# Patient Record
Sex: FEMALE | Race: WHITE | NOT HISPANIC OR LATINO | Employment: FULL TIME | URBAN - METROPOLITAN AREA
[De-identification: names, ages, dates, MRNs, and addresses within clinical notes are randomized per-mention and may not be internally consistent; named-entity substitution may affect disease eponyms.]

---

## 2023-04-03 LAB
CHOLEST SERPL-MSCNC: 149 MG/DL (ref 0–200)
HBA1C MFR BLD: 4.9 % (ref 4–6)
HDLC SERPL-MCNC: 51 MG/DL (ref 35–70)
LDLC SERPL CALC-MCNC: 86 MG/DL
TRIGL SERPL-MCNC: 56 MG/DL (ref 40–160)

## 2023-07-10 ENCOUNTER — TELEPHONE (OUTPATIENT)
Dept: FAMILY MEDICINE | Facility: CLINIC | Age: 33
End: 2023-07-10

## 2023-07-10 NOTE — TELEPHONE ENCOUNTER
----- Message from Claire Khan sent at 7/10/2023 11:00 AM CDT -----  Regarding: ADDERALL REFILL  NEEDS REFILL AT [facility]

## 2023-08-28 DIAGNOSIS — G89.29 CHRONIC FOOT PAIN, UNSPECIFIED LATERALITY: Primary | ICD-10-CM

## 2023-08-28 DIAGNOSIS — M79.673 CHRONIC FOOT PAIN, UNSPECIFIED LATERALITY: Primary | ICD-10-CM

## 2023-08-28 RX ORDER — DICLOFENAC SODIUM 75 MG/1
75 TABLET, DELAYED RELEASE ORAL DAILY PRN
COMMUNITY
Start: 2023-07-10 | End: 2023-08-28 | Stop reason: SDUPTHER

## 2023-08-29 RX ORDER — DICLOFENAC SODIUM 75 MG/1
75 TABLET, DELAYED RELEASE ORAL DAILY PRN
Qty: 30 TABLET | Refills: 2 | Status: SHIPPED | OUTPATIENT
Start: 2023-08-29 | End: 2023-12-28 | Stop reason: SDUPTHER

## 2023-11-08 RX ORDER — NEOMYCIN SULFATE, POLYMYXIN B SULFATE AND DEXAMETHASONE 3.5; 10000; 1 MG/ML; [USP'U]/ML; MG/ML
SUSPENSION/ DROPS OPHTHALMIC
COMMUNITY
Start: 2023-10-24

## 2023-11-08 RX ORDER — SPIRONOLACTONE 25 MG/1
25 TABLET ORAL
COMMUNITY
Start: 2023-08-28

## 2023-11-08 RX ORDER — BUPROPION HYDROCHLORIDE 150 MG/1
300 TABLET ORAL
COMMUNITY
Start: 2023-07-10

## 2023-11-08 RX ORDER — DEXTROAMPHETAMINE SACCHARATE, AMPHETAMINE ASPARTATE, DEXTROAMPHETAMINE SULFATE AND AMPHETAMINE SULFATE 7.5; 7.5; 7.5; 7.5 MG/1; MG/1; MG/1; MG/1
1 TABLET ORAL
COMMUNITY
Start: 2023-06-05

## 2023-11-09 RX ORDER — CHOLECALCIFEROL (VITAMIN D3) 25 MCG
5000 TABLET ORAL DAILY
COMMUNITY

## 2023-11-09 RX ORDER — LANOLIN ALCOHOL/MO/W.PET/CERES
100 CREAM (GRAM) TOPICAL DAILY
COMMUNITY

## 2023-11-09 RX ORDER — TIRZEPATIDE 5 MG/.5ML
5 INJECTION, SOLUTION SUBCUTANEOUS
COMMUNITY

## 2023-12-28 DIAGNOSIS — G89.29 CHRONIC FOOT PAIN, UNSPECIFIED LATERALITY: ICD-10-CM

## 2023-12-28 DIAGNOSIS — M79.673 CHRONIC FOOT PAIN, UNSPECIFIED LATERALITY: ICD-10-CM

## 2023-12-28 RX ORDER — DICLOFENAC SODIUM 75 MG/1
75 TABLET, DELAYED RELEASE ORAL DAILY PRN
Qty: 30 TABLET | Refills: 1 | Status: SHIPPED | OUTPATIENT
Start: 2023-12-28

## 2025-01-16 ENCOUNTER — OFFICE VISIT (OUTPATIENT)
Dept: FAMILY MEDICINE | Facility: CLINIC | Age: 35
End: 2025-01-16
Payer: MEDICAID

## 2025-01-16 VITALS
HEART RATE: 102 BPM | DIASTOLIC BLOOD PRESSURE: 88 MMHG | SYSTOLIC BLOOD PRESSURE: 117 MMHG | HEIGHT: 65 IN | WEIGHT: 293 LBS | OXYGEN SATURATION: 100 % | RESPIRATION RATE: 20 BRPM | BODY MASS INDEX: 48.82 KG/M2 | TEMPERATURE: 99 F

## 2025-01-16 DIAGNOSIS — E66.813 CLASS 3 SEVERE OBESITY WITHOUT SERIOUS COMORBIDITY WITH BODY MASS INDEX (BMI) OF 50.0 TO 59.9 IN ADULT, UNSPECIFIED OBESITY TYPE: ICD-10-CM

## 2025-01-16 DIAGNOSIS — R60.0 LOWER LEG EDEMA: ICD-10-CM

## 2025-01-16 DIAGNOSIS — E55.9 VITAMIN D DEFICIENCY: ICD-10-CM

## 2025-01-16 DIAGNOSIS — E66.01 CLASS 3 SEVERE OBESITY WITHOUT SERIOUS COMORBIDITY WITH BODY MASS INDEX (BMI) OF 50.0 TO 59.9 IN ADULT, UNSPECIFIED OBESITY TYPE: ICD-10-CM

## 2025-01-16 DIAGNOSIS — F98.8 ATTENTION DEFICIT DISORDER, UNSPECIFIED TYPE: Primary | ICD-10-CM

## 2025-01-16 PROCEDURE — 1160F RVW MEDS BY RX/DR IN RCRD: CPT | Mod: CPTII,,,

## 2025-01-16 PROCEDURE — 3008F BODY MASS INDEX DOCD: CPT | Mod: CPTII,,,

## 2025-01-16 PROCEDURE — 99214 OFFICE O/P EST MOD 30 MIN: CPT | Mod: ,,,

## 2025-01-16 PROCEDURE — 1159F MED LIST DOCD IN RCRD: CPT | Mod: CPTII,,,

## 2025-01-16 PROCEDURE — 3079F DIAST BP 80-89 MM HG: CPT | Mod: CPTII,,,

## 2025-01-16 PROCEDURE — 3044F HG A1C LEVEL LT 7.0%: CPT | Mod: CPTII,,,

## 2025-01-16 PROCEDURE — 3074F SYST BP LT 130 MM HG: CPT | Mod: CPTII,,,

## 2025-01-16 RX ORDER — DEXTROAMPHETAMINE SACCHARATE, AMPHETAMINE ASPARTATE, DEXTROAMPHETAMINE SULFATE AND AMPHETAMINE SULFATE 7.5; 7.5; 7.5; 7.5 MG/1; MG/1; MG/1; MG/1
1 TABLET ORAL DAILY
Qty: 30 TABLET | Refills: 0 | Status: SHIPPED | OUTPATIENT
Start: 2025-02-16 | End: 2025-03-18

## 2025-01-16 RX ORDER — SPIRONOLACTONE 25 MG/1
25 TABLET ORAL DAILY
Qty: 90 TABLET | Refills: 1 | Status: SHIPPED | OUTPATIENT
Start: 2025-01-16 | End: 2025-07-15

## 2025-01-16 RX ORDER — DEXTROAMPHETAMINE SACCHARATE, AMPHETAMINE ASPARTATE, DEXTROAMPHETAMINE SULFATE AND AMPHETAMINE SULFATE 7.5; 7.5; 7.5; 7.5 MG/1; MG/1; MG/1; MG/1
1 TABLET ORAL DAILY
Qty: 30 TABLET | Refills: 0 | Status: SHIPPED | OUTPATIENT
Start: 2025-01-16 | End: 2025-02-15

## 2025-01-16 RX ORDER — DEXTROAMPHETAMINE SACCHARATE, AMPHETAMINE ASPARTATE, DEXTROAMPHETAMINE SULFATE AND AMPHETAMINE SULFATE 7.5; 7.5; 7.5; 7.5 MG/1; MG/1; MG/1; MG/1
1 TABLET ORAL DAILY
Qty: 30 TABLET | Refills: 0 | Status: SHIPPED | OUTPATIENT
Start: 2025-03-16 | End: 2025-04-15

## 2025-01-16 RX ORDER — ERGOCALCIFEROL 1.25 MG/1
50000 CAPSULE ORAL
Qty: 4 CAPSULE | Refills: 5 | Status: SHIPPED | OUTPATIENT
Start: 2025-01-16 | End: 2025-07-15

## 2025-01-16 NOTE — ASSESSMENT & PLAN NOTE
Reports feeling edematous in bilateral lower extremities, more so since the birth of her last child 4 mos ago.  Reports prior prescription of Spironolactone 25 mg for bilateral lower extremity edema.    Will restart Spironolactone 25 mg daily.

## 2025-01-16 NOTE — ASSESSMENT & PLAN NOTE
Patient was previously on Mounjaro 5 mg weekly before having her baby.  Advised the patient that insurance would not pay for that at this time.    Body mass index is 55.01 kg/m².  Goal BMI <30.  Exercise 5 times a week for 30 minutes per day.  Increase water intake.   Avoid soda, simple sugars, excessive rice, potatoes or bread. Limit fast foods and fried foods.  Choose complex carbs in moderation (example: green vegetables, beans, oatmeal). Eat plenty of fresh fruits and vegetables with lean meats daily.  Do not skip meals. Eat a balanced portion size.

## 2025-01-16 NOTE — ASSESSMENT & PLAN NOTE
reviewed. No discrepancies noted.     Refill Adderall 30 mg daily, today.      Avoid caffeine and alcohol.   Notify provider of fast heart beat, mood swings, insomnia, elevated blood pressure, or mood swings.       Urine Drug Screen 3 mos.

## 2025-01-16 NOTE — PROGRESS NOTES
"  Patient ID: [id number]     Chief Complaint: Follow-up (Lab results )    HPI:     Eulalia Carlisle is a 34 y.o. female here today for a follow up and to discuss lab results. Labs reviewed. The patient has not been seen in over one year since having her baby.  Pt reports she does not know when her last Pap Smear was done; reports no prior abnormal Pap Smears. Pt reports her GYN prefers performing her next Pap Smear after one year from her last pregnancy. No other complaints today.     Pt reports she would like to re-initiate her Adderall for her ADD, and her Spironolactone for her bilateral lower extremity edema. Pt reports since her recent pregnancy she feels edematous in her bilateral lower extremities. Pt also reports less able to focus on tasks, especially since recent pregnancy, and states she still has "pregnancy brain".    Past Medical History:   Diagnosis Date    ADHD (attention deficit hyperactivity disorder)     Bipolar disorder, unspecified     History of ankle fracture     History of fibula fracture     History of pelvic fracture     Hyperglycemia     Obese         Past Surgical History:   Procedure Laterality Date    ADENOIDECTOMY      ANKLE FRACTURE SURGERY Left     TONSILLECTOMY          Social History     Socioeconomic History    Marital status: Significant Other    Number of children: 1   Tobacco Use    Smoking status: Never    Smokeless tobacco: Never   Substance and Sexual Activity    Alcohol use: Not Currently    Drug use: Never    Sexual activity: Yes     Birth control/protection: Coitus interruptus     Social Drivers of Health     Financial Resource Strain: Low Risk  (1/16/2025)    Overall Financial Resource Strain (CARDIA)     Difficulty of Paying Living Expenses: Not hard at all   Food Insecurity: No Food Insecurity (1/16/2025)    Hunger Vital Sign     Worried About Running Out of Food in the Last Year: Never true     Ran Out of Food in the Last Year: Never true   Transportation Needs: No " "Transportation Needs (1/16/2025)    TRANSPORTATION NEEDS     Transportation : No   Physical Activity: Inactive (1/16/2025)    Exercise Vital Sign     Days of Exercise per Week: 0 days     Minutes of Exercise per Session: 0 min   Stress: No Stress Concern Present (1/16/2025)    [place] [employer] - Occupational Stress Questionnaire     Feeling of Stress : Not at all   Housing Stability: Low Risk  (1/16/2025)    Housing Stability Vital Sign     Unable to Pay for Housing in the Last Year: No     Homeless in the Last Year: No        Current Outpatient Medications   Medication Instructions    dextroamphetamine-amphetamine 30 mg Tab 30 mg, Oral, Daily    [START ON 2/16/2025] dextroamphetamine-amphetamine 30 mg Tab 30 mg, Oral, Daily    [START ON 3/16/2025] dextroamphetamine-amphetamine 30 mg Tab 30 mg, Oral, Daily    ergocalciferol (ERGOCALCIFEROL) 50,000 Units, Oral, Every 7 days    spironolactone (ALDACTONE) 25 mg, Oral, Daily       Review of patient's allergies indicates:   Allergen Reactions    Hydrocodone-acetaminophen Itching and Shortness Of Breath        Patient Care Team:  Rory Slaughter Sr., MD as PCP - General (Family Medicine)     Subjective:     12 point review of systems conducted, negative except as stated in the history of present illness. See HPI for details.    Objective:     Visit Vitals  /88 (BP Location: Right arm, Patient Position: Sitting)   Pulse 102   Temp 98.7 °F (37.1 °C) (Temporal)   Resp 20   Ht 5' 5" (1.651 m)   Wt (!) 150 kg (330 lb 9.6 oz)   LMP 01/11/2025 (Exact Date)   SpO2 100%   BMI 55.01 kg/m²       Physical Exam  Vitals and nursing note reviewed.   Constitutional:       General: She is not in acute distress.     Appearance: She is obese. She is not ill-appearing.   HENT:      Head: Normocephalic and atraumatic.   Eyes:      General: No scleral icterus.     Extraocular Movements: Extraocular movements intact.      Conjunctiva/sclera: Conjunctivae " normal.      Pupils: Pupils are equal, round, and reactive to light.   Neck:      Vascular: No carotid bruit.   Cardiovascular:      Rate and Rhythm: Normal rate and regular rhythm.      Heart sounds: No murmur heard.     No friction rub. No gallop.   Pulmonary:      Effort: Pulmonary effort is normal. No respiratory distress.      Breath sounds: Normal breath sounds. No wheezing, rhonchi or rales.   Abdominal:      General: Abdomen is flat. Bowel sounds are normal. There is no distension.      Palpations: Abdomen is soft. There is no mass.      Tenderness: There is no abdominal tenderness.   Musculoskeletal:         General: Normal range of motion.      Cervical back: Normal range of motion and neck supple.   Skin:     General: Skin is warm and dry.      Comments: Slight, non-pitting edema in bilateral lower extremities.   Neurological:      General: No focal deficit present.      Mental Status: She is alert.   Psychiatric:         Mood and Affect: Mood normal.       Labs Reviewed:     Chemistry:  Lab Results   Component Value Date     01/14/2025    K 4.6 01/14/2025    BUN 11 01/14/2025    CREATININE 0.72 01/14/2025    EGFRNORACEVR 114 01/14/2025    CALCIUM 9.5 01/14/2025    ALBUMIN 4.3 01/14/2025    AST 12 01/14/2025    ALT 12 01/14/2025    DEWGDXZP67GV 18.2 (L) 01/14/2025    TSH 1.480 01/14/2025        Lab Results   Component Value Date    HGBA1C 5.1 01/14/2025        Hematology:  Lab Results   Component Value Date    WBC 5.0 01/14/2025    HGB 12.6 01/14/2025    HCT 39.1 01/14/2025     01/14/2025       Lipid Panel:  Lab Results   Component Value Date    CHOL 182 01/14/2025    HDL 69 01/14/2025    TRIG 89 01/14/2025     Assessment:       ICD-10-CM ICD-9-CM   1. Attention deficit disorder, unspecified type  F98.8 314.00   2. Vitamin D deficiency  E55.9 268.9   3. Lower leg edema  R60.0 782.3   4. Class 3 severe obesity without serious comorbidity with body mass index (BMI) of 50.0 to 59.9 in adult,  unspecified obesity type  E66.813 278.01    Z68.43 V85.43    E66.01      Plan:   1. Attention deficit disorder, unspecified type  Overview:  Adderall 30 mg daily.  Pt reports she has been on Adderall for her ADD since age 19.          Assessment & Plan:   reviewed. No discrepancies noted.     Refill Adderall 30 mg daily, today.      Avoid caffeine and alcohol.   Notify provider of fast heart beat, mood swings, insomnia, elevated blood pressure, or mood swings.       Urine Drug Screen 3 mos.    Orders:  -     dextroamphetamine-amphetamine 30 mg Tab; Take 1 tablet (30 mg total) by mouth once daily.  Dispense: 30 tablet; Refill: 0  -     dextroamphetamine-amphetamine 30 mg Tab; Take 1 tablet (30 mg total) by mouth once daily.  Dispense: 30 tablet; Refill: 0  -     dextroamphetamine-amphetamine 30 mg Tab; Take 1 tablet (30 mg total) by mouth once daily.  Dispense: 30 tablet; Refill: 0  -     Drug Screen, Urine; Future; Expected date: 04/16/2025    2. Vitamin D deficiency  Overview:  Background:  Vitamin-D level today:  18.  Preferred range per Endocrine Society is 40-60 ng/mL (Ref)  Current supplementation: None.          Assessment & Plan:  Plan:  50,000 vitamin-D3 weekly.  Repeat vitamin-D in 6 months.    Orders:  -     Vitamin D; Future; Expected date: 07/16/2025  -     ergocalciferol (ERGOCALCIFEROL) 50,000 unit Cap; Take 1 capsule (50,000 Units total) by mouth every 7 days.  Dispense: 4 capsule; Refill: 5    3. Lower leg edema  Overview:  Spironolactone 25 mg once daily.    Assessment & Plan:  Reports feeling edematous in bilateral lower extremities, more so since the birth of her last child 4 mos ago.  Reports prior prescription of Spironolactone 25 mg for bilateral lower extremity edema.    Will restart Spironolactone 25 mg daily.      Orders:  -     spironolactone (ALDACTONE) 25 MG tablet; Take 1 tablet (25 mg total) by mouth once daily.  Dispense: 90 tablet; Refill: 1    4. Class 3 severe obesity without  serious comorbidity with body mass index (BMI) of 50.0 to 59.9 in adult, unspecified obesity type  Assessment & Plan:  Patient was previously on Mounjaro 5 mg weekly before having her baby.  Advised the patient that insurance would not pay for that at this time.    Body mass index is 55.01 kg/m².  Goal BMI <30.  Exercise 5 times a week for 30 minutes per day.  Increase water intake.   Avoid soda, simple sugars, excessive rice, potatoes or bread. Limit fast foods and fried foods.  Choose complex carbs in moderation (example: green vegetables, beans, oatmeal). Eat plenty of fresh fruits and vegetables with lean meats daily.  Do not skip meals. Eat a balanced portion size.    Orders:  -     Vitamin D; Future; Expected date: 07/16/2025      Follow up in about 3 months (around 4/16/2025) for Virtual Visit. In addition to their scheduled follow up, the patient has also been instructed to follow up on as needed basis.     Yazmin Luevano NP

## 2025-04-22 PROCEDURE — 80307 DRUG TEST PRSMV CHEM ANLYZR: CPT

## 2025-04-24 ENCOUNTER — CLINICAL SUPPORT (OUTPATIENT)
Dept: FAMILY MEDICINE | Facility: CLINIC | Age: 35
End: 2025-04-24
Payer: MEDICAID

## 2025-04-24 DIAGNOSIS — E55.9 VITAMIN D DEFICIENCY: Primary | ICD-10-CM

## 2025-04-24 DIAGNOSIS — F98.8 ATTENTION DEFICIT DISORDER, UNSPECIFIED TYPE: ICD-10-CM

## 2025-04-24 RX ORDER — DEXTROAMPHETAMINE SACCHARATE, AMPHETAMINE ASPARTATE, DEXTROAMPHETAMINE SULFATE AND AMPHETAMINE SULFATE 7.5; 7.5; 7.5; 7.5 MG/1; MG/1; MG/1; MG/1
30 TABLET ORAL DAILY
Qty: 30 TABLET | Refills: 0 | Status: SHIPPED | OUTPATIENT
Start: 2025-05-25 | End: 2025-06-24

## 2025-04-24 RX ORDER — DEXTROAMPHETAMINE SACCHARATE, AMPHETAMINE ASPARTATE, DEXTROAMPHETAMINE SULFATE AND AMPHETAMINE SULFATE 7.5; 7.5; 7.5; 7.5 MG/1; MG/1; MG/1; MG/1
30 TABLET ORAL DAILY
Qty: 30 TABLET | Refills: 0 | Status: SHIPPED | OUTPATIENT
Start: 2025-04-24 | End: 2025-05-24

## 2025-04-24 RX ORDER — DEXTROAMPHETAMINE SACCHARATE, AMPHETAMINE ASPARTATE, DEXTROAMPHETAMINE SULFATE AND AMPHETAMINE SULFATE 7.5; 7.5; 7.5; 7.5 MG/1; MG/1; MG/1; MG/1
30 TABLET ORAL DAILY
Qty: 30 TABLET | Refills: 0 | Status: SHIPPED | OUTPATIENT
Start: 2025-06-25 | End: 2025-07-25

## 2025-04-24 NOTE — PROGRESS NOTES
Family Medicine      Patient ID: [id number]     Chief Complaint: No chief complaint on file.      HPI:     This is a telemedicine note. Patient was treated using telemedicine, real time audio and video, according to [facility] protocols. INatividad MD conducted the visit from the [facility]. The patient participated in the visit at a non-[facility] location selected by the patient, identified below. I am licensed in the state where the patient stated they are located. The patient stated that they understood and accepted the privacy and security risks to their information at their location. This visit is not recorded.    Patient was located at the patient's home.      274}    Eulalia Carlisle is a 35 y.o. female here today for a telemedicine visit.     ADHD  Patient is taking 15 mg Adderall in the morning and 15 in the afternoon.  It is lasting all day.  She denied any problems with appetite suppression or insomnia.  No anxiety.  It is helping her focus and complete tasks during the day.    Past Medical History:   Diagnosis Date    ADHD (attention deficit hyperactivity disorder)     Bipolar disorder, unspecified     History of ankle fracture     History of fibula fracture     History of pelvic fracture     Hyperglycemia     Obese         Past Surgical History:   Procedure Laterality Date    ADENOIDECTOMY      ANKLE FRACTURE SURGERY Left     TONSILLECTOMY          Social History     Tobacco Use    Smoking status: Never    Smokeless tobacco: Never   Substance and Sexual Activity    Alcohol use: Not Currently    Drug use: Never    Sexual activity: Yes     Birth control/protection: Coitus interruptus        Current Outpatient Medications   Medication Instructions    dextroamphetamine-amphetamine (ADDERALL) 30 mg Tab 30 mg, Oral, Daily    [START ON 5/25/2025] dextroamphetamine-amphetamine (ADDERALL) 30 mg Tab 30 mg, Oral, Daily    [START ON 6/25/2025] dextroamphetamine-amphetamine (ADDERALL) 30 mg Tab 30 mg, Oral,  Daily    ergocalciferol (ERGOCALCIFEROL) 50,000 Units, Oral, Every 7 days    spironolactone (ALDACTONE) 25 mg, Oral, Daily       Review of patient's allergies indicates:   Allergen Reactions    Hydrocodone-acetaminophen Itching and Shortness Of Breath        Patient Care Team:  Rory Slaughter Sr., MD as PCP - General (Family Medicine)     Subjective:     Review of Systems   Constitutional:  Negative for activity change and unexpected weight change.   HENT:  Negative for hearing loss, rhinorrhea and trouble swallowing.    Eyes:  Negative for discharge and visual disturbance.   Respiratory:  Negative for chest tightness and wheezing.    Cardiovascular:  Negative for chest pain and palpitations.   Gastrointestinal:  Negative for blood in stool, constipation, diarrhea and vomiting.   Endocrine: Negative for polydipsia and polyuria.   Genitourinary:  Negative for difficulty urinating, dysuria, hematuria and menstrual problem.   Musculoskeletal:  Negative for arthralgias, joint swelling and neck pain.   Neurological:  Negative for weakness and headaches.   Psychiatric/Behavioral:  Negative for confusion and dysphoric mood.        12 point review of systems conducted, negative except as stated in the history of present illness. See HPI for details.    Objective:     There were no vitals taken for this visit.    Physical Exam    Physical Exam: LIMITED DUE TO TELEMEDICINE RESTRICTIONS.  General: Alert and oriented, No acute distress.  Head: Normocephalic, Atraumatic.  Eye: Sclera non-icteric.  Neck/Thyroid:  Full range of motion.  Respiratory: Non-labored respirations, Symmetrical chest wall expansion.  Musculoskeletal: Normal range of motion.  Integumentary:  No visible suspicious lesions or rashes. No diaphoresis.   Neurologic: No focal deficits  Psychiatric: Normal interaction, Coherent speech, Euthymic mood, Appropriate affect     Assessment:       ICD-10-CM ICD-9-CM   1. Vitamin D deficiency  E55.9 268.9   2.  Attention deficit disorder, unspecified type  F98.8 314.00        Plan:     1. Vitamin D deficiency  Overview:  Background:  Vitamin-D level today:  18.  Preferred range per Endocrine Society is 40-60 ng/mL (Ref)  Current supplementation: None.          Assessment & Plan:  Continue vitamin-D 82380 units weekly.  Recheck vitamin-D level 3 months.    Orders:  -     Vitamin D; Future; Expected date: 07/24/2025    2. Attention deficit disorder, unspecified type  Overview:  Adderall 30 mg daily.  Pt reports she has been on Adderall for her ADD since age 19.          Assessment & Plan:   reviewed. No discrepancies noted.     Refill Adderall 30 mg daily, today.      Avoid caffeine and alcohol.   Notify provider of fast heart beat, mood swings, insomnia, elevated blood pressure, or mood swings.       Urine Drug Screen 3 mos.    Orders:  -     Drug Screen, Urine; Future; Expected date: 07/24/2025  -     dextroamphetamine-amphetamine (ADDERALL) 30 mg Tab; Take 1 tablet (30 mg total) by mouth once daily.  Dispense: 30 tablet; Refill: 0  -     dextroamphetamine-amphetamine (ADDERALL) 30 mg Tab; Take 1 tablet (30 mg total) by mouth once daily.  Dispense: 30 tablet; Refill: 0  -     dextroamphetamine-amphetamine (ADDERALL) 30 mg Tab; Take 1 tablet (30 mg total) by mouth once daily.  Dispense: 30 tablet; Refill: 0         No follow-ups on file. In addition to their scheduled follow up, the patient has also been instructed to follow up on as needed basis.     Future Appointments   Date Time Provider Department Center   7/16/2025  9:20 AM LAB, [facility] FAMILY MED [facility]   7/21/2025  1:20 PM PROVIDER, [facility] FAMILY MEDICINE [facility]        Video Time Documentation:  Spent 10 minutes with patient face to face discussed health concerns. More than 50% of this time was spent in counseling and coordination of care.    Natividad Broussard MD

## 2025-04-25 ENCOUNTER — DOCUMENTATION ONLY (OUTPATIENT)
Facility: CLINIC | Age: 35
End: 2025-04-25
Payer: MEDICAID